# Patient Record
Sex: FEMALE | Race: WHITE | NOT HISPANIC OR LATINO | URBAN - METROPOLITAN AREA
[De-identification: names, ages, dates, MRNs, and addresses within clinical notes are randomized per-mention and may not be internally consistent; named-entity substitution may affect disease eponyms.]

---

## 2018-04-09 ENCOUNTER — APPOINTMENT (RX ONLY)
Dept: URBAN - METROPOLITAN AREA CLINIC 23 | Facility: CLINIC | Age: 58
Setting detail: DERMATOLOGY
End: 2018-04-09

## 2018-04-09 DIAGNOSIS — Z41.9 ENCOUNTER FOR PROCEDURE FOR PURPOSES OTHER THAN REMEDYING HEALTH STATE, UNSPECIFIED: ICD-10-CM

## 2018-04-09 PROCEDURE — ? DYSPORT

## 2018-04-09 PROCEDURE — ? FILLERS

## 2018-04-09 NOTE — PROCEDURE: FILLERS
Consent: Written consent obtained. Risks include but not limited to bruising, beading, irregular texture, ulceration, infection, allergic reaction, scar formation, incomplete augmentation, temporary nature, procedural pain.
Tear Troughs Filler  Volume In Cc: 0
Filler: Voluma
Include Cannula Information In Note?: No
Anesthesia Type: 1% lidocaine without epinephrine
Post-Care Instructions: Patient instructed to apply ice to reduce swelling.
Expiration Date (Month Year): 05/19
Lot #: [id number]
Additional Area 1 Location: lateral mouth, lateral cheeks, jawline, NLF, chin
Additional Anesthesia Volume In Cc: 6
Additional Area 1 Volume In Cc: 1
Map Statment: See [address] for Complete Details
Detail Level: Simple
Anesthesia Volume In Cc: 0.2

## 2018-04-23 ENCOUNTER — APPOINTMENT (RX ONLY)
Dept: URBAN - METROPOLITAN AREA CLINIC 23 | Facility: CLINIC | Age: 58
Setting detail: DERMATOLOGY
End: 2018-04-23

## 2018-04-23 DIAGNOSIS — Z41.9 ENCOUNTER FOR PROCEDURE FOR PURPOSES OTHER THAN REMEDYING HEALTH STATE, UNSPECIFIED: ICD-10-CM

## 2018-04-23 PROCEDURE — ? DYSPORT

## 2018-04-23 ASSESSMENT — LOCATION ZONE DERM: LOCATION ZONE: FACE

## 2018-04-23 ASSESSMENT — LOCATION SIMPLE DESCRIPTION DERM: LOCATION SIMPLE: RIGHT FOREHEAD

## 2018-04-23 ASSESSMENT — LOCATION DETAILED DESCRIPTION DERM: LOCATION DETAILED: RIGHT FOREHEAD

## 2018-04-23 NOTE — PROCEDURE: DYSPORT
Additional Area 4 Location: neck bands
Additional Area 4 Units: 0
Additional Area 6 Location: neck
Consent: Written consent obtained. Risks include but not limited to lid/brow ptosis, bruising, swelling, diplopia, temporary effect, incomplete chemical denervation.
Additional Area 1 Location: right forehead high
Expiration Date (Month Year): 09/30/2018
Lot #: [id number]
Detail Level: Zone
Additional Area 1 Units: 6
Dilution (U/ 0.1cc): 1.5
Price (Use Numbers Only, No Special Characters Or $): 0.00
Post-Care Instructions: Patient instructed to not lie down for 4 hours and limit physical activity for 24 hours. Patient instructed not to travel by airplane for 48 hours.
Additional Area 3 Location: above left lateral brow

## 2018-10-09 ENCOUNTER — APPOINTMENT (RX ONLY)
Dept: URBAN - METROPOLITAN AREA CLINIC 23 | Facility: CLINIC | Age: 58
Setting detail: DERMATOLOGY
End: 2018-10-09

## 2018-10-09 DIAGNOSIS — L82.0 INFLAMED SEBORRHEIC KERATOSIS: ICD-10-CM

## 2018-10-09 DIAGNOSIS — Z85.828 PERSONAL HISTORY OF OTHER MALIGNANT NEOPLASM OF SKIN: ICD-10-CM

## 2018-10-09 DIAGNOSIS — Z41.9 ENCOUNTER FOR PROCEDURE FOR PURPOSES OTHER THAN REMEDYING HEALTH STATE, UNSPECIFIED: ICD-10-CM

## 2018-10-09 PROCEDURE — ? COUNSELING

## 2018-10-09 PROCEDURE — ? BENIGN DESTRUCTION

## 2018-10-09 PROCEDURE — 17110 DESTRUCTION B9 LES UP TO 14: CPT

## 2018-10-09 PROCEDURE — 99213 OFFICE O/P EST LOW 20 MIN: CPT | Mod: 25

## 2018-10-09 PROCEDURE — ? BOTOX

## 2018-10-09 PROCEDURE — ? FILLERS

## 2018-10-09 PROCEDURE — ? SUNSCREEN RECOMMENDATIONS

## 2018-10-09 ASSESSMENT — LOCATION SIMPLE DESCRIPTION DERM: LOCATION SIMPLE: LEFT CHEEK

## 2018-10-09 ASSESSMENT — LOCATION ZONE DERM: LOCATION ZONE: FACE

## 2018-10-09 ASSESSMENT — LOCATION DETAILED DESCRIPTION DERM: LOCATION DETAILED: LEFT MEDIAL MALAR CHEEK

## 2018-10-09 NOTE — PROCEDURE: SUNSCREEN RECOMMENDATIONS

## 2018-10-09 NOTE — PROCEDURE: BOTOX
Masseter Units: 0
Lot #: [id number]
Additional Area 3 Location: lateral eyes
Additional Area 2 Units: [address]
Glabellar Complex Units: 16
Additional Area 3 Units: 8
Additional Area 2 Location: high forehead 2 cm above brows
Additional Area 4 Location: chin
Additional Area 4 Units: 4
Additional Area 1 Location: lateral brows
Expiration Date (Month Year): 12/2020
Price (Use Numbers Only, No Special Characters Or $): 0.00
Consent: Written consent obtained. Risks include but not limited to lid/brow ptosis, bruising, swelling, diplopia, temporary effect, incomplete chemical denervation.
Additional Area 6 Location: Forehead
Detail Level: Zone
Additional Area 5 Location: high forehead
Dilution (U/0.1 Cc): 1.2

## 2018-10-09 NOTE — HPI: SKIN LESION
What Type Of Note Output Would You Prefer (Optional)?: Bullet Format
How Severe Is Your Skin Lesion?: moderate
Has Your Skin Lesion Been Treated?: not been treated
Is This A New Presentation, Or A Follow-Up?: Growth

## 2018-10-09 NOTE — PROCEDURE: FILLERS
Jawline Filler Volume In Cc: 0
Additional Area 2 Volume In Cc: 1
Expiration Date (Month Year): 07/31/19
Anesthesia Volume In Cc: 0.2
Detail Level: Zone
Include Cannula Brand?: DermaSculpt
Include Cannula Information In Note?: No
Anesthesia Type: 1% lidocaine with epinephrine
Additional Area 1 Location: lateral mouth, fine lines perioral, marionette lines, vermillion lips
Additional Area 1 Location: lateral mouth, marionette lines, lateral cheeks, lower face fine lines
Additional Area 4 Location: lat brow using an acannula ,jawline and NLFs
Filler: Juvederm Ultra XC
Map Statment: See [address] for Complete Details
Additional Area 3 Location: lateral eyes, nasal root
Filler: Juvederm Voluma XC
Price (Use Numbers Only, No Special Characters Or $): 0.00
Post-Care Instructions: Patient instructed to apply ice to reduce swelling.
Additional Anesthesia Volume In Cc: 6
Expiration Date (Month Year): 08/19/2019
Additional Area 2 Location: diluted injected fine lines lat brows and fine lines cheeks
Additional Area 2 Location: lat jawline,Marionette and mouth area
Expiration Date (Month Year): 10/29/2019
Additional Area 5 Location: NLFs,tear trough  and lips
Consent: Written consent obtained. Risks include but not limited to bruising, beading, irregular texture, ulceration, infection, allergic reaction, scar formation, incomplete augmentation, temporary nature, procedural pain.
Lot #: [id number]
Lot #: [id number]
Lot #: [id number]
Additional Area 2 Location: Lateral cheeks, lateral jawline, marionette lines
Additional Area 1 Location: using the cannula to lateral cheeks.

## 2018-11-07 ENCOUNTER — APPOINTMENT (RX ONLY)
Dept: URBAN - METROPOLITAN AREA CLINIC 23 | Facility: CLINIC | Age: 58
Setting detail: DERMATOLOGY
End: 2018-11-07

## 2018-11-07 DIAGNOSIS — Z85.828 PERSONAL HISTORY OF OTHER MALIGNANT NEOPLASM OF SKIN: ICD-10-CM

## 2018-11-07 DIAGNOSIS — L82.0 INFLAMED SEBORRHEIC KERATOSIS: ICD-10-CM

## 2018-11-07 PROCEDURE — 17110 DESTRUCTION B9 LES UP TO 14: CPT

## 2018-11-07 PROCEDURE — ? LIQUID NITROGEN

## 2018-11-07 PROCEDURE — 99212 OFFICE O/P EST SF 10 MIN: CPT | Mod: 25

## 2018-11-07 PROCEDURE — ? SUNSCREEN RECOMMENDATIONS

## 2018-11-07 ASSESSMENT — LOCATION SIMPLE DESCRIPTION DERM: LOCATION SIMPLE: LEFT BREAST

## 2018-11-07 ASSESSMENT — LOCATION DETAILED DESCRIPTION DERM: LOCATION DETAILED: LEFT MEDIAL BREAST 10-11:00 REGION

## 2018-11-07 ASSESSMENT — LOCATION ZONE DERM: LOCATION ZONE: TRUNK

## 2018-11-07 NOTE — PROCEDURE: SUNSCREEN RECOMMENDATIONS

## 2018-11-07 NOTE — PROCEDURE: LIQUID NITROGEN
Number Of Freeze-Thaw Cycles: 2 freeze-thaw cycles
Render Post-Care Instructions In Note?: no
Medical Necessity Information: It is in your best interest to select a reason for this procedure from the list below. All of these items fulfill various CMS LCD requirements except the new and changing color options.
Medical Necessity Clause: This procedure was medically necessary because the lesions that were treated were:
Post-Care Instructions: I reviewed with the patient in detail post-care instructions. Patient is to wear sunprotection, and avoid picking at any of the treated lesions. Pt may apply Vaseline to crusted or scabbing areas.
Consent: The patient's consent was obtained including but not limited to risks of crusting, scabbing, blistering, scarring, darker or lighter pigmentary change, recurrence, incomplete removal and infection.
Detail Level: Simple
Duration Of Freeze Thaw-Cycle (Seconds): 5-10

## 2019-04-11 ENCOUNTER — APPOINTMENT (RX ONLY)
Dept: URBAN - METROPOLITAN AREA CLINIC 23 | Facility: CLINIC | Age: 59
Setting detail: DERMATOLOGY
End: 2019-04-11

## 2019-04-11 DIAGNOSIS — Z41.9 ENCOUNTER FOR PROCEDURE FOR PURPOSES OTHER THAN REMEDYING HEALTH STATE, UNSPECIFIED: ICD-10-CM

## 2019-04-11 PROCEDURE — ? FILLERS

## 2019-04-11 PROCEDURE — ? DYSPORT

## 2019-04-11 PROCEDURE — ? RECOMMENDATIONS

## 2019-04-11 NOTE — PROCEDURE: RECOMMENDATIONS
Detail Level: Detailed
Recommendation Preamble: The following recommendations were made during the visit:
Recommendations (Free Text): Profound for lower face tightening

## 2019-04-11 NOTE — PROCEDURE: DYSPORT
Forehead Units: [address] [address]
Additional Area 6 Units: 0
Additional Area 3 Location: Chin
Detail Level: Simple
Consent: Written consent obtained. Risks include but not limited to lid/brow ptosis, bruising, swelling, diplopia, temporary effect, incomplete chemical denervation.
Additional Area 4 Location: [facility]
Additional Area 1 Location: lateral brows
Nasal Root Units: 10
Dilution (U/ 0.1cc): 1.5
Periorbital Skin Units: 30
Lot #: [id number]
Price (Use Numbers Only, No Special Characters Or $): 0.00
Expiration Date (Month Year): 10/31/2019
Glabellar Complex Units: [address]

## 2019-04-11 NOTE — PROCEDURE: FILLERS
Lateral Face Filler  Volume In Cc: 0
Include Cannula Information In Note?: No
Include Cannula Length?: 1.5 inch
Additional Area 1 Location: perioral fine lines, lateral mouth, marionette lines
Additional Area 1 Location: lateral mouth, fine lines perioral, marionette lines, lateral cheeks, vermillion lips
Map Statment: See [address] for Complete Details
Additional Area 2 Location: Lips, oral commissure, glabellar fine fines
Additional Anesthesia Volume In Cc: 6
Additional Area 1 Location: lips ( physician sample)
Additional Area 2 Location: Nasalabial, Glabellar fine lines- Complimentary
Additional Area 3 Location: Glabbellar fine lines, Nasalabial folds, Marionette lines, vermillion lip
Additional Area 2 Location: line
Additional Area 4 Location: Lateral Cheeks
Additional Area 3 Location: glabella lines,upper lips fine lines,oral commesure and chin
Filler: Voluma
Additional Area 4 Location: lateral jawline, lateral cheeks, oral commissure, chin
Additional Area 4 Volume In Cc: 1
Lot #: [id number]
Detail Level: Zone
Additional Area 5 Location: medial  Cheeks using cannula
Lot #: [id number]
Expiration Date (Month Year): 07/25/2019
Lot #: [id number]
Price (Use Numbers Only, No Special Characters Or $): 0.00
Expiration Date (Month Year): 08/31/2019
Consent: Written consent obtained. Risks include but not limited to bruising, beading, irregular texture, ulceration, infection, allergic reaction, scar formation, incomplete augmentation, temporary nature, procedural pain.
Expiration Date (Month Year): 4/21/20
Post-Care Instructions: Patient instructed to apply ice to reduce swelling.
Anesthesia Type: 1% lidocaine without epinephrine
Include Cannula Brand?: DermaSculpt
Include Cannula Size?: 25G

## 2019-08-27 ENCOUNTER — APPOINTMENT (RX ONLY)
Dept: URBAN - METROPOLITAN AREA CLINIC 23 | Facility: CLINIC | Age: 59
Setting detail: DERMATOLOGY
End: 2019-08-27

## 2019-08-27 DIAGNOSIS — Z41.9 ENCOUNTER FOR PROCEDURE FOR PURPOSES OTHER THAN REMEDYING HEALTH STATE, UNSPECIFIED: ICD-10-CM

## 2019-08-27 PROCEDURE — ? FILLERS

## 2019-08-27 PROCEDURE — ? DYSPORT

## 2019-08-27 PROCEDURE — ? RECOMMENDATIONS

## 2019-08-27 NOTE — PROCEDURE: DYSPORT
Additional Area 3 Units: 48
Nasal Root Units: 0
Additional Area 1 Location: neck bands
Lot #: [id number]
Additional Area 4 Units: [address] [address]
Additional Area 5 Location: high forehead 2 cm above brows
Expiration Date (Month Year): 03/31/2020
Additional Area 2 Units: 10
Detail Level: Simple
Consent: Written consent obtained. Risks include but not limited to lid/brow ptosis, bruising, swelling, diplopia, temporary effect, incomplete chemical denervation.
Additional Area 4 Location: glabella
Dilution (U/ 0.1cc): 1.5
Additional Area 1 Units: [address] [facility]
Additional Area 2 Location: bunny lines
Additional Area 5 Units: 30
Price (Use Numbers Only, No Special Characters Or $): 0.00
Additional Area 3 Location: lateral eyes

## 2019-08-27 NOTE — PROCEDURE: FILLERS
Dorsal Hands Filler  Volume In Cc: 0
Include Cannula Size?: 25G
Additional Area 1 Location: Using the cannula to cheeks. Using a conventional needle to lateral jawline, lateral cheeks, marionette lines.
Additional Area 1 Location: lateral mouth, fine lines perioral, marionette lines, lateral cheeks, vermillion lips
Include Cannula Length?: 1.5 inch
Additional Area 1 Location: lateral mouth, perioral fine lines, vermillion lips, marionette lines
Additional Anesthesia Volume In Cc: 6
Additional Area 1 Volume In Cc: 1
Additional Area 2 Location: cheeks, jawline, oral commissure
Additional Area 2 Location: Nasalabial, Glabellar fine lines- Complimentary
Additional Area 2 Location: Lips, oral commissure, glabellar fine fines
Filler: Juvederm Voluma XC
Additional Area 3 Location: Glabbellar fine lines, Nasalabial folds, Marionette lines, vermillion lip
Detail Level: Zone
Include Cannula Information In Note?: No
Additional Area 4 Location: Perioral
Additional Area 4 Location: cheeks, lateral jawline, medial cheeks fine lines
Additional Area 5 Location: Cheeks, vermillion lips, marionette fine lines, glabellar fine lines, lateral mouth
Price (Use Numbers Only, No Special Characters Or $): 0.00
Lot #: [id number]
Map Statment: See [address] for Complete Details
Lot #: [id number]
Lot #: [id number]
Expiration Date (Month Year): 08/31/2019
Expiration Date (Month Year): 09/2021
Expiration Date (Month Year): 2020-11-04
Include Cannula Information In Note?: Yes
Anesthesia Type: 1% lidocaine without epinephrine
Include Cannula Brand?: DermaSculpt
Post-Care Instructions: Patient instructed to apply ice to reduce swelling.
Consent: Written consent obtained. Risks include but not limited to bruising, beading, irregular texture, ulceration, infection, allergic reaction, scar formation, incomplete augmentation, temporary nature, procedural pain.

## 2019-09-16 ENCOUNTER — APPOINTMENT (RX ONLY)
Dept: URBAN - METROPOLITAN AREA CLINIC 23 | Facility: CLINIC | Age: 59
Setting detail: DERMATOLOGY
End: 2019-09-16

## 2019-09-16 DIAGNOSIS — Z41.9 ENCOUNTER FOR PROCEDURE FOR PURPOSES OTHER THAN REMEDYING HEALTH STATE, UNSPECIFIED: ICD-10-CM

## 2019-09-16 PROCEDURE — ? FILLERS

## 2019-09-16 PROCEDURE — ? PULSED-DYE LASER

## 2019-09-16 NOTE — PROCEDURE: FILLERS
Nasolabial Folds Filler Volume In Cc: 0
Filler Comments: Physician sample
Additional Area 3 Location: Glabbellar fine lines, Nasalabial folds, Marionette lines, vermillion lip
Consent: Written consent obtained. Risks include but not limited to bruising, beading, irregular texture, ulceration, infection, allergic reaction, scar formation, incomplete augmentation, temporary nature, procedural pain.
Post-Care Instructions: Patient instructed to apply ice to reduce swelling.
Filler: Juvederm Volbella XC
Detail Level: Zone
Lot #: [id number]
Additional Area 4 Location: Perioral
Additional Area 4 Location: cheeks, lateral jawline, medial cheeks fine lines
Include Cannula Information In Note?: No
Price (Use Numbers Only, No Special Characters Or $): 0.00
Expiration Date (Month Year): 08/31/2019
Additional Area 5 Location: Cheeks, vermillion lips, marionette fine lines, glabellar fine lines, lateral mouth
Lot #: [id number]
Lot #: [id number]
Map Statment: See [address] for Complete Details
Expiration Date (Month Year): 2020-02-26
Expiration Date (Month Year): 09/2021
Anesthesia Type: 1% lidocaine without epinephrine
Include Cannula Brand?: DermaSculpt
Additional Area 2 Location: Nasalabial, Glabellar fine lines- Complimentary
Include Cannula Size?: 25G
Additional Anesthesia Volume In Cc: 6
Additional Area 1 Location: lateral mouth, perioral fine lines, vermillion lips, marionette lines
Additional Area 1 Location: left cheek ( physician sample)
Include Cannula Length?: 1.5 inch
Additional Area 1 Volume In Cc: 0.3
Additional Area 1 Location: lateral mouth, fine lines perioral, marionette lines, lateral cheeks, vermillion lips
Additional Area 2 Location: Lips, oral commissure, glabellar fine fines
Additional Area 2 Location: cheeks, jawline, oral commissure

## 2019-09-16 NOTE — PROCEDURE: PULSED-DYE LASER
Price (Use Numbers Only, No Special Characters Or $): 0.00
Fluence In J/Cm2 (Optional): 10.0
Cryogen Time (Ms): 10
Delay Time (Ms): [facility]
Location Override: post cosmetic ( left cheek) and spots nose
Spot Size: 10 mm
Cryogen Time (Ms): [address] [address]
Detail Level: Zone
Delay Time (Ms): 20
Laser Type: Vbeam 595nm
Pulse Duration: 6 ms
Consent: Written consent obtained, risks reviewed including but not limited to crusting, scabbing, blistering, scarring, darker or lighter pigmentary change, incidental hair removal, bruising, and/or incomplete removal.
Pulse Duration: 20 ms
Post-Care Instructions: I reviewed with the patient in detail post-care instructions. Patient should stay away from the sun and wear sun protection until treated areas are fully healed.
Cryogen Time (Ms): 30
Treated Area: medium area
Pulse Duration: 10 ms
Spot Size: 10x3 mm
Treated Area: small area
Fluence In J/Cm2 (Optional): 6.25
Fluence In J/Cm2 (Optional): 7.00
Post-Procedure Care: Sunscreen applied. Post care reviewed with patient.
Spot Size: 7 mm

## 2020-02-17 ENCOUNTER — APPOINTMENT (RX ONLY)
Dept: URBAN - METROPOLITAN AREA CLINIC 23 | Facility: CLINIC | Age: 60
Setting detail: DERMATOLOGY
End: 2020-02-17

## 2020-02-17 DIAGNOSIS — Z85.828 PERSONAL HISTORY OF OTHER MALIGNANT NEOPLASM OF SKIN: ICD-10-CM

## 2020-02-17 DIAGNOSIS — L57.0 ACTINIC KERATOSIS: ICD-10-CM

## 2020-02-17 DIAGNOSIS — D485 NEOPLASM OF UNCERTAIN BEHAVIOR OF SKIN: ICD-10-CM

## 2020-02-17 DIAGNOSIS — Z41.9 ENCOUNTER FOR PROCEDURE FOR PURPOSES OTHER THAN REMEDYING HEALTH STATE, UNSPECIFIED: ICD-10-CM

## 2020-02-17 PROBLEM — D48.5 NEOPLASM OF UNCERTAIN BEHAVIOR OF SKIN: Status: ACTIVE | Noted: 2020-02-17

## 2020-02-17 PROCEDURE — 99213 OFFICE O/P EST LOW 20 MIN: CPT | Mod: 25

## 2020-02-17 PROCEDURE — ? DYSPORT

## 2020-02-17 PROCEDURE — 17000 DESTRUCT PREMALG LESION: CPT | Mod: 59

## 2020-02-17 PROCEDURE — 11102 TANGNTL BX SKIN SINGLE LES: CPT

## 2020-02-17 PROCEDURE — ? LIQUID NITROGEN

## 2020-02-17 PROCEDURE — ? COUNSELING

## 2020-02-17 PROCEDURE — ? SUNSCREEN RECOMMENDATIONS

## 2020-02-17 PROCEDURE — ? BIOPSY BY SHAVE METHOD

## 2020-02-17 PROCEDURE — ? FILLERS

## 2020-02-17 ASSESSMENT — LOCATION ZONE DERM
LOCATION ZONE: TRUNK
LOCATION ZONE: NOSE

## 2020-02-17 ASSESSMENT — LOCATION SIMPLE DESCRIPTION DERM
LOCATION SIMPLE: CHEST
LOCATION SIMPLE: NOSE

## 2020-02-17 ASSESSMENT — LOCATION DETAILED DESCRIPTION DERM
LOCATION DETAILED: MIDDLE STERNUM
LOCATION DETAILED: NASAL SUPRATIP

## 2020-02-17 NOTE — PROCEDURE: DYSPORT
Additional Area 3 Location: lateral eyes
Additional Area 6 Units: 0
Dilution (U/ 0.1cc): 1.5
Expiration Date (Month Year): 09/30/2020
Additional Area 5 Location: glabella
Additional Area 4 Location: bunny lines
Additional Area 1 Units: [address] [address]
Lot #: [id number]
Additional Area 4 Units: 10
Additional Area 3 Units: [address]
Additional Area 2 Units: 30
Additional Area 1 Location: high forehead 2 cm above brows
Price (Use Numbers Only, No Special Characters Or $): 0.00
Detail Level: Simple
Consent: Written consent obtained. Risks include but not limited to lid/brow ptosis, bruising, swelling, diplopia, temporary effect, incomplete chemical denervation.

## 2020-02-17 NOTE — PROCEDURE: BIOPSY BY SHAVE METHOD
Detail Level: Detailed
Depth Of Biopsy: dermis
Was A Bandage Applied: Yes
Size Of Lesion In Cm: 0
Biopsy Type: H and E
Biopsy Method: 15 blade
Anesthesia Type: 1% lidocaine without epinephrine
Anesthesia Volume In Cc (Will Not Render If 0): 0.3
Hemostasis: Electrocautery
Wound Care: Vaseline
Dressing: bandage
Destruction After The Procedure: No
Type Of Destruction Used: Curettage
Curettage Text: The wound bed was treated with curettage after the biopsy was performed.
Cryotherapy Text: The wound bed was treated with cryotherapy after the biopsy was performed.
Electrodesiccation Text: The wound bed was treated with electrodesiccation after the biopsy was performed.
Electrodesiccation And Curettage Text: The wound bed was treated with electrodesiccation and curettage after the biopsy was performed.
Silver Nitrate Text: The wound bed was treated with silver nitrate after the biopsy was performed.
Lab: [id number] [facility] [address]
Consent: The provider's intent is to obtain a tissue sample solely for diagnostic purposes. Written consent to obtain tissue sample was obtained and risks were reviewed including but not limited to scarring, infection, bleeding, scabbing, incomplete removal, nerve damage and allergy to anesthesia.
Post-Care Instructions: I reviewed with the patient in detail post-care instructions. Patient is to keep the biopsy site dry overnight, and then apply bacitracin twice daily until healed. Patient may apply hydrogen peroxide soaks to remove any crusting.
Notification Instructions: Patient will be notified of biopsy results. However, patient instructed to call the office if not contacted within 2 weeks.
Billing Type: [employer]

## 2020-02-17 NOTE — PROCEDURE: FILLERS
Additional Area 2 Location: Nasalabial, Glabellar fine lines- Complimentary
Decollete Filler  Volume In Cc: 0
Include Cannula Size?: 25G
Additional Area 2 Location: Lips, oral commissure, glabellar fine fines
Additional Anesthesia Volume In Cc: 6
Additional Area 3 Location: Glabbellar fine lines, Nasalabial folds, Marionette lines, vermillion lip
Additional Area 1 Volume In Cc: 1
Include Cannula Length?: 1.5 inch
Additional Area 1 Location: lateral jawline, lateral cheeks, lateral mouth, NLFâs
Additional Area 2 Location: cheeks, jawline, oral commissure
Additional Area 4 Location: Perioral
Additional Area 4 Location: lateral jawline, lateral mouth, glabella lines,
Lot #: [id number]
Filler: Restylane-L
Detail Level: Zone
Consent: Written consent obtained. Risks include but not limited to bruising, beading, irregular texture, ulceration, infection, allergic reaction, scar formation, incomplete augmentation, temporary nature, procedural pain.
Lot #: [id number]
Post-Care Instructions: Patient instructed to apply ice to reduce swelling.
Additional Area 5 Location: Cheeks, vermillion lips, marionette fine lines, glabellar fine lines, lateral mouth
Expiration Date (Month Year): 09/2021
Price (Use Numbers Only, No Special Characters Or $): 0.00
Use Map Statement For Sites (Optional): No
Expiration Date (Month Year): 08/31/2019
Lot #: [id number]
Map Statment: See [address] for Complete Details
Include Cannula Brand?: DermaSculpt
Expiration Date (Month Year): 2022-06-30
Anesthesia Type: 1% lidocaine without epinephrine
Additional Area 1 Location: lateral mouth, perioral fine lines, vermillion lips, marionette lines
Additional Area 1 Location: lateral mouth, fine lines perioral, marionette lines, lateral cheeks, vermillion lips

## 2020-04-23 ENCOUNTER — APPOINTMENT (RX ONLY)
Dept: URBAN - METROPOLITAN AREA CLINIC 23 | Facility: CLINIC | Age: 60
Setting detail: DERMATOLOGY
End: 2020-04-23

## 2020-04-23 DIAGNOSIS — L24 IRRITANT CONTACT DERMATITIS: ICD-10-CM

## 2020-04-23 PROBLEM — Z92.89 PERSONAL HISTORY OF OTHER MEDICAL TREATMENT: Status: ACTIVE | Noted: 2020-04-23

## 2020-04-23 PROBLEM — D04.5 CARCINOMA IN SITU OF SKIN OF TRUNK: Status: ACTIVE | Noted: 2020-04-23

## 2020-04-23 PROBLEM — L24.9 IRRITANT CONTACT DERMATITIS, UNSPECIFIED CAUSE: Status: ACTIVE | Noted: 2020-04-23

## 2020-04-23 PROCEDURE — ? PRESCRIPTION

## 2020-04-23 PROCEDURE — ? REASON FOR TELEMEDICINE VISIT

## 2020-04-23 PROCEDURE — ? DIAGNOSIS COMMENT

## 2020-04-23 PROCEDURE — ? CONSENT FOR TELEMEDICINE VISIT OBTAINED

## 2020-04-23 PROCEDURE — ? TELEHEALTH ASSESSMENT

## 2020-04-23 PROCEDURE — ? DEFER

## 2020-04-23 PROCEDURE — 99213 OFFICE O/P EST LOW 20 MIN: CPT | Mod: GT

## 2020-04-23 PROCEDURE — ? COUNSELING

## 2020-04-23 PROCEDURE — ? PATHOLOGY DISCUSSION

## 2020-04-23 RX ORDER — TRIAMCINOLONE ACETONIDE 1 MG/G
1 OINTMENT TOPICAL BID
Qty: 1 | Refills: 1 | Status: ERX | COMMUNITY
Start: 2020-04-23

## 2020-04-23 RX ADMIN — TRIAMCINOLONE ACETONIDE 1: 1 OINTMENT TOPICAL at 00:00

## 2020-04-23 ASSESSMENT — LOCATION DETAILED DESCRIPTION DERM: LOCATION DETAILED: RIGHT RADIAL DORSAL HAND

## 2020-04-23 ASSESSMENT — LOCATION ZONE DERM: LOCATION ZONE: HAND

## 2020-04-23 ASSESSMENT — LOCATION SIMPLE DESCRIPTION DERM: LOCATION SIMPLE: RIGHT HAND

## 2020-04-23 NOTE — PROCEDURE: DEFER
Detail Level: Detailed
Body Location Override (Optional - Billing Will Still Be Based On Selected Body Map Location If Applicable): middle sternum
Introduction Text (Please End With A Colon): The following procedure was deferred:

## 2020-04-23 NOTE — PROCEDURE: MIPS QUALITY
Quality 130: Documentation Of Current Medications In The Medical Record: Current Medications Documented
Additional Notes: patient is not in the age range for a pneomonia vaccine & tobacco use 4/2020
Detail Level: Detailed

## 2020-04-23 NOTE — PROCEDURE: TELEHEALTH ASSESSMENT
Detail Level: Simple
Assessment (Free Text): The patient verified their identity with their photo ID and consented to evaluation and management of their medical condition through telehealth. This visit was conducted in real-time with an audio and video platform, [email or web address] me during the [address] during a state of national emergency. This telehealth visit was medically necessary to prevent the community spread of COVID-19. \\n\\nThe patient is aware that we will bill their insurance for this telehealth visit following insurance guidelines. \\n\\nFace to face time with the patient was 15 minutes. \\n\\nConsent:\\n\\nPatient consented to the following prior to the visit: \"I consent and understand that I am initiating a synchronous video health session with my healthcare provider and will be asked to confirm my identity with photo identification. I understand that there are potential risks to this technology, including interruptions, potential data breaches, and technical difficulties. Poor video quality may interfere with my healthcare providerâs ability to accurately diagnose my condition. I understand that my health care provider or I can discontinue the telemedicine consult/visit if it is felt that the videoconferencing connections are not adequate for the situation. I also understand that my insurance carrier will be billed for healthcare services rendered. \" TYPED YES TO CONSENT
Recommendation Preamble: Assessment:

## 2020-06-08 ENCOUNTER — APPOINTMENT (RX ONLY)
Dept: URBAN - METROPOLITAN AREA CLINIC 23 | Facility: CLINIC | Age: 60
Setting detail: DERMATOLOGY
End: 2020-06-08

## 2020-06-08 VITALS — TEMPERATURE: 97.3 F

## 2020-06-08 DIAGNOSIS — Z85.828 PERSONAL HISTORY OF OTHER MALIGNANT NEOPLASM OF SKIN: ICD-10-CM

## 2020-06-08 DIAGNOSIS — Z41.9 ENCOUNTER FOR PROCEDURE FOR PURPOSES OTHER THAN REMEDYING HEALTH STATE, UNSPECIFIED: ICD-10-CM

## 2020-06-08 PROBLEM — D04.5 CARCINOMA IN SITU OF SKIN OF TRUNK: Status: ACTIVE | Noted: 2020-06-08

## 2020-06-08 PROCEDURE — 17260 DSTRJ MAL LES T/A/L 0.5 CM/<: CPT

## 2020-06-08 PROCEDURE — 99213 OFFICE O/P EST LOW 20 MIN: CPT | Mod: 25

## 2020-06-08 PROCEDURE — ? COUNSELING

## 2020-06-08 PROCEDURE — ? SUNSCREEN RECOMMENDATIONS

## 2020-06-08 PROCEDURE — ? CRYOSURGICAL DESTRUCTION

## 2020-06-08 PROCEDURE — ? DYSPORT

## 2020-06-08 NOTE — PROCEDURE: CRYOSURGICAL DESTRUCTION
Detail Level: Detailed
Add Intralesional Injection: No
Medication Injected: 5-Fluorouracil
Total Volume (Ccs): 1
Anesthesia Type: 1% lidocaine with epinephrine
Anesthesia Volume In Cc: 0.3
Number Of Freeze-Thaw Cycles: 3 freeze-thaw cycles
Total Time In Minutes: 3 minutes
Additional Information: (Optional): The wound was cleaned, and a dressing was applied. The patient received detailed post-op instructions.
Pre-Procedure: The surgical site was antiseptically prepared.
Consent was obtained from the patient. The risks and benefits to therapy were discussed in detail. Specifically, the risks of infection, scarring, bleeding, prolonged wound healing, incomplete removal, allergy to anesthesia, nerve injury and recurrence were addressed. Alternatives to liquid nitrogen, such as ED&C, surgical removal, XRT were also discussed. Prior to the procedure, the treatment site was clearly identified and confirmed by the patient. All components of Universal Protocol/PAUSE Rule completed.
Render Post-Care In The Note: Yes
Post-Care Instructions: I reviewed with the patient in detail post-care instructions. Patient is to keep the area dry for 48 hours, and not to engage in any heavy lifting, exercise, or swimming for the next 14 days. Should the patient develop any fevers, chills, bleeding, severe pain patient will contact the office immediately.
Bill As A Line Item Or As Units: Line Item

## 2020-06-08 NOTE — PROCEDURE: DYSPORT
Additional Area 5 Location: glabella
Levator Labii Superioris Units: 0
Show Levator Superior Units: Yes
Dilution (U/ 0.1cc): 1.5
Additional Area 3 Location: high forehead 3 cm above brows
Show Mentalis Units: No
Detail Level: Simple
Consent: Written consent obtained. Risks include but not limited to lid/brow ptosis, bruising, swelling, diplopia, temporary effect, incomplete chemical denervation.
Additional Area 1 Location: glabella ( touch-up)
Additional Area 4 Location: 2cm high forehead
Expiration Date (Month Year): 11/30/2020
Additional Area 1 Units: [facility]
Price (Use Numbers Only, No Special Characters Or $): 0.00
Lot #: [id number]
Additional Area 2 Location: crows feet

## 2020-07-22 ENCOUNTER — APPOINTMENT (RX ONLY)
Dept: URBAN - METROPOLITAN AREA CLINIC 23 | Facility: CLINIC | Age: 60
Setting detail: DERMATOLOGY
End: 2020-07-22

## 2020-07-22 VITALS — TEMPERATURE: 97.3 F

## 2020-07-22 DIAGNOSIS — Z41.9 ENCOUNTER FOR PROCEDURE FOR PURPOSES OTHER THAN REMEDYING HEALTH STATE, UNSPECIFIED: ICD-10-CM

## 2020-07-22 PROCEDURE — ? FILLERS

## 2020-07-22 PROCEDURE — ? ADDITIONAL NOTES

## 2020-07-22 PROCEDURE — ? DYSPORT

## 2020-07-22 ASSESSMENT — LOCATION SIMPLE DESCRIPTION DERM: LOCATION SIMPLE: SCALP

## 2020-07-22 ASSESSMENT — LOCATION DETAILED DESCRIPTION DERM: LOCATION DETAILED: LEFT CENTRAL OCCIPITAL SCALP

## 2020-07-22 ASSESSMENT — LOCATION ZONE DERM: LOCATION ZONE: SCALP

## 2020-07-22 NOTE — PROCEDURE: DYSPORT
Additional Area 4 Location: 2cm high forehead
Detail Level: Simple
Periorbital Skin Units: [address]
Mentalis Units: 0
Expiration Date (Month Year): 09/30/20
Show Masseter Units: Yes
Additional Area 2 Location: lat brows
Show Right And Left Brow Units: No
Additional Area 1 Units: 10
Price (Use Numbers Only, No Special Characters Or $): 0.00
Glabellar Complex Units: [address]
Additional Area 1 Location: bunny lines
Lot #: [id number]
Additional Area 5 Location: glabella
Lateral Platysmal Bands Units: [address] [facility]
Dilution (U/ 0.1cc): 1.5
Additional Area 3 Location: high forehead 3 cm above brows
Consent: Written consent obtained. Risks include but not limited to lid/brow ptosis, bruising, swelling, diplopia, temporary effect, incomplete chemical denervation.

## 2020-07-22 NOTE — PROCEDURE: FILLERS
Map Statment: See [address] for Complete Details
Dorsal Hands Filler  Volume In Cc: 0
Additional Area 2 Location: Nasalabial, Glabellar fine lines- Complimentary
Additional Area 4 Location: lateral jawline, lateral mouth, glabella lines,
Additional Area 3 Location: Glabbellar fine lines, Nasalabial folds, Marionette lines, vermillion lip
Anesthesia Type: 1% lidocaine without epinephrine
Additional Area 5 Location: Cheeks, vermillion lips, marionette fine lines, glabellar fine lines, lateral mouth
Additional Area 4 Location: Perioral
Consent: Written consent obtained. Risks include but not limited to bruising, beading, irregular texture, ulceration, infection, allergic reaction, scar formation, incomplete augmentation, temporary nature, procedural pain.
Lot #: [id number]
Post-Care Instructions: Patient instructed to apply ice to reduce swelling.
Include Cannula Information In Note?: No
Expiration Date (Month Year): 2022-09
Lot #: [id number]
Additional Anesthesia Volume In Cc: 6
Lot #: [id number]
Expiration Date (Month Year): 09/2021
Expiration Date (Month Year): 08/31/2019
Include Cannula Brand?: DermaSculpt
Include Cannula Size?: 25G
Detail Level: Zone
Filler: Restylane-L
Additional Area 1 Location: cheeks, lateral mouth, nlfs , glabella
Include Cannula Length?: 1.5 inch
Price (Use Numbers Only, No Special Characters Or $): 0.00
Additional Area 2 Location: jawline, oral commissure and upper lip
Additional Area 1 Location: lateral mouth, perioral fine lines, vermillion lips, marionette lines
Additional Area 2 Volume In Cc: 1
Additional Area 1 Location: lateral mouth, fine lines perioral, marionette lines, lateral cheeks, vermillion lips
Additional Area 2 Location: Lips, oral commissure, glabellar fine fines

## 2020-08-19 NOTE — PROCEDURE: DYSPORT
Additional Area 1 Units: [address]
Additional Area 2 Units: 10
Additional Area 3 Location: nose
Additional Area 2 Location: chin
Periorbital Skin Units: 0
Playful
Glabellar Complex Units: 27
Detail Level: Zone
Additional Area 1 Location: Lateral eyes
Additional Area 4 Location: neck
Additional Area 4 Units: [address]
Additional Area 5 Location: high forehead
Additional Area 5 Units: 30
Consent: Written consent obtained. Risks include but not limited to lid/brow ptosis, bruising, swelling, diplopia, temporary effect, incomplete chemical denervation.
Dilution (U/ 0.1cc): 1.5
Expiration Date (Month Year): 09/18
Post-Care Instructions: Patient instructed to not lie down for 4 hours and limit physical activity for 24 hours. Patient instructed not to travel by airplane for 48 hours.
Lot #: [id number]

## 2020-08-24 ENCOUNTER — APPOINTMENT (RX ONLY)
Dept: URBAN - METROPOLITAN AREA CLINIC 23 | Facility: CLINIC | Age: 60
Setting detail: DERMATOLOGY
End: 2020-08-24

## 2020-08-24 VITALS — TEMPERATURE: 97.7 F

## 2020-08-24 DIAGNOSIS — Z85.828 PERSONAL HISTORY OF OTHER MALIGNANT NEOPLASM OF SKIN: ICD-10-CM

## 2020-08-24 PROCEDURE — ? COUNSELING

## 2020-08-24 PROCEDURE — ? SUNSCREEN RECOMMENDATIONS

## 2020-08-24 NOTE — PROCEDURE: MIPS QUALITY
Quality 226: Preventive Care And Screening: Tobacco Use: Screening And Cessation Intervention: Patient screened for tobacco use, is a smoker AND did not received Cessation Counseling for Unknown Reasons
Quality 111:Pneumonia Vaccination Status For Older Adults: Pneumococcal Vaccination not Administered or Previously Received, Reason not Otherwise Specified
Detail Level: Detailed
Quality 130: Documentation Of Current Medications In The Medical Record: Current Medications Documented

## 2020-12-29 ENCOUNTER — RX ONLY (OUTPATIENT)
Age: 60
Setting detail: RX ONLY
End: 2020-12-29

## 2020-12-29 ENCOUNTER — APPOINTMENT (RX ONLY)
Dept: URBAN - METROPOLITAN AREA CLINIC 23 | Facility: CLINIC | Age: 60
Setting detail: DERMATOLOGY
End: 2020-12-29

## 2020-12-29 VITALS — TEMPERATURE: 97.2 F

## 2020-12-29 DIAGNOSIS — Z41.9 ENCOUNTER FOR PROCEDURE FOR PURPOSES OTHER THAN REMEDYING HEALTH STATE, UNSPECIFIED: ICD-10-CM

## 2020-12-29 PROCEDURE — ? DYSPORT

## 2020-12-29 PROCEDURE — ? FILLERS

## 2020-12-29 RX ORDER — BETAMETHASONE DIPROPIONATE 0.5 MG/G
OINTMENT TOPICAL
Qty: 1 | Refills: 2 | Status: ERX | COMMUNITY
Start: 2020-12-29

## 2020-12-29 NOTE — PROCEDURE: DYSPORT
Show Ucl Units: No
Forehead Units: 0
Show Forehead Units: Yes
Additional Area 3 Location: neck bands
Additional Area 5 Units: 10
Lot #: [id number]
Additional Area 1 Location: high forehead
Additional Area 3 Units: [address]
Additional Area 6 Location: lateral brows
Glabellar Complex Units: 48
Price (Use Numbers Only, No Special Characters Or $): 0.00
Additional Area 4 Location: 2cm high forehead
Expiration Date (Month Year): 08/31/21
Additional Area 2 Location: crows feet
Periorbital Skin Units: [address]
Detail Level: Simple
Additional Area 5 Location: bunnies lines
Consent: Written consent obtained. Risks include but not limited to lid/brow ptosis, bruising, swelling, diplopia, temporary effect, incomplete chemical denervation.
Dilution (U/ 0.1cc): 1.5

## 2020-12-29 NOTE — PROCEDURE: FILLERS
[place] Filler  Volume In Cc: 0
Detail Level: Zone
Filler: Mohan Amaya
Include Cannula Information In Note?: No
Additional Area 2 Volume In Cc: 1
Include Cannula Brand?: DermaSculpt
Additional Area 2 Location: lateral cheeks, jawline, oral commissure
Lot #: [id number]
Additional Area 1 Location: lateral mouth, perioral fine lines, vermillion lips, marionette lines
Include Cannula Length?: 1.5 inch
Additional Area 1 Location: cheeks, lateral mouth, nlfs , glabella
Expiration Date (Month Year): 09/2021
Additional Anesthesia Volume In Cc: 6
Include Cannula Size?: 25G
Lot #: [id number]
Post-Care Instructions: Patient instructed to apply ice to reduce swelling.
Consent: Written consent obtained. Risks include but not limited to bruising, beading, irregular texture, ulceration, infection, allergic reaction, scar formation, incomplete augmentation, temporary nature, procedural pain.
Anesthesia Type: 1% lidocaine without epinephrine
Additional Area 4 Location: Perioral
Expiration Date (Month Year): 2023-07-31
Lot #: [id number]
Additional Area 3 Location: Glabbellar fine lines, Nasalabial folds, Marionette lines, vermillion lip
Additional Area 2 Location: Nasalabial, Glabellar fine lines- Complimentary
Map Statment: See [address] for Complete Details
Additional Area 2 Location: Lips, oral commissure, glabellar fine fines
Additional Area 5 Location: Cheeks, vermillion lips, marionette fine lines, glabellar fine lines, lateral mouth
Additional Area 1 Location: lateral mouth, fine lines perioral, marionette lines, lateral cheeks, vermillion lips
Expiration Date (Month Year): 08/31/2019
Price (Use Numbers Only, No Special Characters Or $): 0.00
Additional Area 4 Location: lateral jawline, lateral mouth, glabella lines,

## 2021-01-19 ENCOUNTER — APPOINTMENT (RX ONLY)
Dept: URBAN - METROPOLITAN AREA CLINIC 23 | Facility: CLINIC | Age: 61
Setting detail: DERMATOLOGY
End: 2021-01-19

## 2021-01-19 VITALS — TEMPERATURE: 98.1 F

## 2021-01-19 DIAGNOSIS — Z41.9 ENCOUNTER FOR PROCEDURE FOR PURPOSES OTHER THAN REMEDYING HEALTH STATE, UNSPECIFIED: ICD-10-CM

## 2021-01-19 PROCEDURE — ? DYSPORT

## 2021-01-19 NOTE — PROCEDURE: DYSPORT
Inferior Lateral Orbicularis Oculi Units: 0
Show Nasal Units: Yes
Additional Area 4 Location: 2cm high forehead
Detail Level: Simple
Expiration Date (Month Year): 2021/05
Show Mentalis Units: No
Periorbital Skin Units: 10
Additional Area 1 Location: high forehead
Forehead Units: 5
Additional Area 3 Location: high forehead 3 cm above brows
Consent: Written consent obtained. Risks include but not limited to lid/brow ptosis, bruising, swelling, diplopia, temporary effect, incomplete chemical denervation.
Dilution (U/ 0.1cc): 1.5
Additional Area 5 Location: glabella
Lot #: [id number]
Price (Use Numbers Only, No Special Characters Or $): 0.00
Additional Area 2 Location: bunny line

## 2021-02-18 ENCOUNTER — APPOINTMENT (RX ONLY)
Dept: URBAN - METROPOLITAN AREA CLINIC 23 | Facility: CLINIC | Age: 61
Setting detail: DERMATOLOGY
End: 2021-02-18

## 2021-02-18 VITALS — TEMPERATURE: 97.3 F

## 2021-02-18 DIAGNOSIS — L40.8 OTHER PSORIASIS: ICD-10-CM

## 2021-02-18 DIAGNOSIS — L21.8 OTHER SEBORRHEIC DERMATITIS: ICD-10-CM

## 2021-02-18 PROCEDURE — ? INTRALESIONAL KENALOG

## 2021-02-18 PROCEDURE — ? PRESCRIPTION

## 2021-02-18 PROCEDURE — 11900 INJECT SKIN LESIONS </W 7: CPT

## 2021-02-18 PROCEDURE — 99213 OFFICE O/P EST LOW 20 MIN: CPT | Mod: 25

## 2021-02-18 PROCEDURE — ? COUNSELING

## 2021-02-18 RX ORDER — FLUOCINOLONE ACETONIDE 0.1 MG/ML
SOLUTION TOPICAL
Qty: 1 | Refills: 0 | Status: CANCELLED
Stop reason: CLARIF

## 2021-02-18 ASSESSMENT — LOCATION DETAILED DESCRIPTION DERM
LOCATION DETAILED: LEFT SUPERIOR POSTERIOR NECK
LOCATION DETAILED: RIGHT CENTRAL FRONTAL SCALP
LOCATION DETAILED: RIGHT CENTRAL PARIETAL SCALP

## 2021-02-18 ASSESSMENT — LOCATION ZONE DERM
LOCATION ZONE: NECK
LOCATION ZONE: SCALP

## 2021-02-18 ASSESSMENT — LOCATION SIMPLE DESCRIPTION DERM
LOCATION SIMPLE: SCALP
LOCATION SIMPLE: POSTERIOR NECK
LOCATION SIMPLE: RIGHT SCALP

## 2021-02-18 NOTE — PROCEDURE: INTRALESIONAL KENALOG
Include Z78.9 (Other Specified Conditions Influencing Health Status) As An Associated Diagnosis?: No
Treatment Number (Optional): 1
Medical Necessity Clause: This procedure was medically necessary because the lesions that were treated were:
Total Volume Injected (Ccs- Only Use Numbers And Decimals): 0.2
Concentration Of Solution Injected (Mg/Ml): 4.0
Detail Level: Detailed
Consent: The risks of atrophy were reviewed with the patient.
Lot # (Optional): [id number]
Expiration Date (Optional): 03/22
Ndc# For Kenalog Only: [id number]
X Size Of Lesion In Cm (Optional): 0
Kenalog Preparation: Kenalog
Administered By (Optional): Dr Tc Morales

## 2021-02-19 ENCOUNTER — RX ONLY (OUTPATIENT)
Age: 61
Setting detail: RX ONLY
End: 2021-02-19

## 2021-02-19 RX ORDER — FLUOCINOLONE ACETONIDE 0.1 MG/ML
SOLUTION TOPICAL
Qty: 1 | Refills: 2 | Status: ERX | COMMUNITY
Start: 2021-02-19

## 2021-03-05 ENCOUNTER — RX ONLY (OUTPATIENT)
Age: 61
Setting detail: RX ONLY
End: 2021-03-05

## 2021-03-05 RX ORDER — BETAMETHASONE DIPROPIONATE 0.5 MG/G
1 OINTMENT TOPICAL QD
Qty: 1 | Refills: 2 | Status: ERX

## 2021-03-11 ENCOUNTER — APPOINTMENT (RX ONLY)
Dept: URBAN - METROPOLITAN AREA CLINIC 23 | Facility: CLINIC | Age: 61
Setting detail: DERMATOLOGY
End: 2021-03-11

## 2021-03-11 VITALS — TEMPERATURE: 97.7 F

## 2021-03-11 DIAGNOSIS — L21.8 OTHER SEBORRHEIC DERMATITIS: ICD-10-CM

## 2021-03-11 PROCEDURE — 99213 OFFICE O/P EST LOW 20 MIN: CPT

## 2021-03-11 PROCEDURE — ? PRESCRIPTION

## 2021-03-11 PROCEDURE — ? COUNSELING

## 2021-03-11 PROCEDURE — ? TREATMENT REGIMEN

## 2021-03-11 RX ORDER — CLOBETASOL PROPIONATE 0.5 MG/G
AEROSOL, FOAM TOPICAL
Qty: 1 | Refills: 3 | Status: ERX | COMMUNITY
Start: 2021-03-11

## 2021-03-11 RX ORDER — FLUCONAZOLE 150 MG/1
TABLET ORAL
Qty: 3 | Refills: 0 | Status: ERX | COMMUNITY
Start: 2021-03-11

## 2021-03-11 RX ADMIN — CLOBETASOL PROPIONATE 1: 0.5 AEROSOL, FOAM TOPICAL at 00:00

## 2021-03-11 RX ADMIN — FLUCONAZOLE: 150 TABLET ORAL at 00:00

## 2021-03-11 NOTE — PROCEDURE: TREATMENT REGIMEN
Discontinue Regimen: Synalar solution (pt states too greasy)
Initiate Treatment: clobetasol foam (selected as more tolerable vehicle)\\ndiflucan 150mg weekly x 3 weeks
Continue Regimen: Alternate T-gel with Head and Shoulders
Plan: Pt with classic areas of seborrheic dermatitis noted today: ears, eyebrows, posterior neck and scalp. Trial of oral diflucan to alleviate symptoms and improvement condition.
Detail Level: Detailed

## 2021-04-21 ENCOUNTER — APPOINTMENT (RX ONLY)
Dept: URBAN - METROPOLITAN AREA CLINIC 23 | Facility: CLINIC | Age: 61
Setting detail: DERMATOLOGY
End: 2021-04-21

## 2021-04-21 VITALS — TEMPERATURE: 97.3 F

## 2021-04-21 DIAGNOSIS — L21.8 OTHER SEBORRHEIC DERMATITIS: ICD-10-CM

## 2021-04-21 DIAGNOSIS — L24 IRRITANT CONTACT DERMATITIS: ICD-10-CM

## 2021-04-21 PROBLEM — L24.9 IRRITANT CONTACT DERMATITIS, UNSPECIFIED CAUSE: Status: ACTIVE | Noted: 2021-04-21

## 2021-04-21 PROCEDURE — 99213 OFFICE O/P EST LOW 20 MIN: CPT

## 2021-04-21 PROCEDURE — ? PRESCRIPTION

## 2021-04-21 PROCEDURE — ? COUNSELING

## 2021-04-21 PROCEDURE — ? RECOMMENDATIONS

## 2021-04-21 RX ORDER — FLUOCINOLONE ACETONIDE 0.11 MG/ML
1 OIL TOPICAL PRN
Qty: 1 | Refills: 3 | Status: ACTIVE

## 2021-04-21 RX ORDER — CLOBETASOL PROPIONATE 0.05 G/100ML
1 SHAMPOO TOPICAL PRN
Qty: 1 | Refills: 1 | Status: ERX | COMMUNITY
Start: 2021-04-21

## 2021-04-21 RX ORDER — BETAMETHASONE DIPROPIONATE 0.5 MG/G
1 OINTMENT TOPICAL QHS
Qty: 1 | Refills: 2 | Status: CANCELLED
Stop reason: CLARIF

## 2021-04-21 RX ADMIN — CLOBETASOL PROPIONATE 1: 0.05 SHAMPOO TOPICAL at 00:00

## 2021-04-21 ASSESSMENT — LOCATION ZONE DERM
LOCATION ZONE: SCALP
LOCATION ZONE: NECK

## 2021-04-21 ASSESSMENT — LOCATION SIMPLE DESCRIPTION DERM
LOCATION SIMPLE: POSTERIOR NECK
LOCATION SIMPLE: RIGHT SCALP

## 2021-04-21 ASSESSMENT — LOCATION DETAILED DESCRIPTION DERM
LOCATION DETAILED: RIGHT MEDIAL FRONTAL SCALP
LOCATION DETAILED: MID POSTERIOR NECK

## 2021-04-21 NOTE — PROCEDURE: RECOMMENDATIONS
Render Risk Assessment In Note?: no
Recommendation Preamble: The following recommendations were made during the visit:
Detail Level: Detailed
Recommendations (Free Text): Recommended scalpicin OTC

## 2021-04-22 ENCOUNTER — RX ONLY (OUTPATIENT)
Age: 61
Setting detail: RX ONLY
End: 2021-04-22

## 2021-04-22 RX ORDER — CLOBETASOL PROPIONATE 0.05 G/100ML
1 SHAMPOO TOPICAL QD
Qty: 1 | Refills: 0 | Status: ERX

## 2021-04-22 RX ORDER — FLUOCINOLONE ACETONIDE 0.11 MG/ML
1 OIL TOPICAL QOD
Qty: 1 | Refills: 2 | Status: ERX | COMMUNITY
Start: 2021-04-22

## 2021-09-24 ENCOUNTER — RX ONLY (OUTPATIENT)
Age: 61
Setting detail: RX ONLY
End: 2021-09-24

## 2021-09-24 RX ORDER — CLOBETASOL PROPIONATE 0.5 MG/G
PRN AEROSOL, FOAM TOPICAL
Qty: 50 | Refills: 2 | Status: ERX

## 2021-09-27 ENCOUNTER — APPOINTMENT (RX ONLY)
Dept: URBAN - METROPOLITAN AREA CLINIC 23 | Facility: CLINIC | Age: 61
Setting detail: DERMATOLOGY
End: 2021-09-27

## 2021-09-27 DIAGNOSIS — L82.0 INFLAMED SEBORRHEIC KERATOSIS: ICD-10-CM

## 2021-09-27 DIAGNOSIS — L30.9 DERMATITIS, UNSPECIFIED: ICD-10-CM

## 2021-09-27 PROCEDURE — 17110 DESTRUCTION B9 LES UP TO 14: CPT

## 2021-09-27 PROCEDURE — ? LIQUID NITROGEN

## 2021-09-27 PROCEDURE — 99212 OFFICE O/P EST SF 10 MIN: CPT | Mod: 25

## 2021-09-27 PROCEDURE — ? COUNSELING

## 2021-09-27 PROCEDURE — ? RECOMMENDATIONS

## 2021-09-27 ASSESSMENT — LOCATION SIMPLE DESCRIPTION DERM
LOCATION SIMPLE: LEFT UPPER BACK
LOCATION SIMPLE: PERIANAL SKIN

## 2021-09-27 ASSESSMENT — LOCATION DETAILED DESCRIPTION DERM
LOCATION DETAILED: PERIANAL SKIN
LOCATION DETAILED: LEFT MID-UPPER BACK

## 2021-09-27 ASSESSMENT — LOCATION ZONE DERM
LOCATION ZONE: ANUS
LOCATION ZONE: TRUNK

## 2021-09-27 NOTE — PROCEDURE: RECOMMENDATIONS
Recommendations (Free Text): Desitin for the buttocks
Detail Level: Detailed
Recommendation Preamble: The following recommendations were made at todayâs visit :
Render Risk Assessment In Note?: no

## 2021-10-13 NOTE — PROCEDURE: MIPS QUALITY
Quality 111:Pneumonia Vaccination Status For Older Adults: Pneumococcal Vaccination not Administered or Previously Received, Reason not Otherwise Specified
Detail Level: Detailed
Quality 130: Documentation Of Current Medications In The Medical Record: Current Medications Documented
Bilateral groin sites s/p ablation

## 2021-10-26 ENCOUNTER — APPOINTMENT (RX ONLY)
Dept: URBAN - METROPOLITAN AREA CLINIC 23 | Facility: CLINIC | Age: 61
Setting detail: DERMATOLOGY
End: 2021-10-26

## 2021-10-26 DIAGNOSIS — Z41.9 ENCOUNTER FOR PROCEDURE FOR PURPOSES OTHER THAN REMEDYING HEALTH STATE, UNSPECIFIED: ICD-10-CM

## 2021-10-26 PROCEDURE — ? DYSPORT

## 2021-10-26 PROCEDURE — ? FILLERS

## 2021-10-26 NOTE — PROCEDURE: FILLERS
Dorsal Hands Filler  Volume In Cc: 0
Additional Anesthesia Volume In Cc: 6
Include Cannula Size?: 25G
Expiration Date (Month Year): 08/31/2019
Additional Area 1 Location: cheeks, lateral mouth, nlfs
Lot #: [id number]
Include Cannula Information In Note?: No
Additional Area 1 Volume In Cc: 1
Expiration Date (Month Year): 09/2021
Include Cannula Length?: 1.5 inch
Additional Area 2 Location: cheeks, jawline, oral commissure
Detail Level: Zone
Filler: Mohan Amaya
Include Cannula Brand?: DermaSculpt
Additional Area 1 Location: lateral mouth, fine lines perioral, marionette lines, lateral cheeks, vermillion lips
Price (Use Numbers Only, No Special Characters Or $): 0.00
Additional Area 4 Location: lateral jawline, lateral mouth, glabella lines,
Consent: Written consent obtained. Risks include but not limited to bruising, beading, irregular texture, ulceration, infection, allergic reaction, scar formation, incomplete augmentation, temporary nature, procedural pain.
Additional Area 1 Location: lateral mouth, perioral fine lines, vermillion lips, marionette lines
Additional Area 2 Location: Nasalabial, Glabellar fine lines- Complimentary
Post-Care Instructions: Patient instructed to apply ice to reduce swelling.
Additional Area 5 Location: Cheeks, vermillion lips, marionette fine lines, glabellar fine lines, lateral mouth
Map Statment: See [address] for Complete Details
Additional Area 2 Location: Lips, oral commissure, glabellar fine fines
Lot #: [id number]
Additional Area 3 Location: Glabbellar fine lines, Nasalabial folds, Marionette lines, vermillion lip
Anesthesia Type: 1% lidocaine without epinephrine
Expiration Date (Month Year): 2022-4-30
Additional Area 4 Location: Perioral
Lot #: [id number]

## 2021-10-26 NOTE — PROCEDURE: DYSPORT
Inferior Lateral Orbicularis Oculi Units: 0
Show Nasal Units: Yes
Additional Area 4 Location: 2cm high forehead
Detail Level: Simple
Expiration Date (Month Year): 2021/05
Show Mentalis Units: No
Periorbital Skin Units: 10
Additional Area 1 Location: high forehead
Forehead Units: 5
Additional Area 3 Location: high forehead 3 cm above brows
Consent: Written consent obtained. Risks include but not limited to lid/brow ptosis, bruising, swelling, diplopia, temporary effect, incomplete chemical denervation.
Dilution (U/ 0.1cc): 1.5
Additional Area 5 Location: glabella
Lot #: [id number]
Price (Use Numbers Only, No Special Characters Or $): 0.00
Additional Area 2 Location: bunny line

## 2022-06-13 ENCOUNTER — APPOINTMENT (RX ONLY)
Dept: URBAN - METROPOLITAN AREA CLINIC 23 | Facility: CLINIC | Age: 62
Setting detail: DERMATOLOGY
End: 2022-06-13

## 2022-06-13 DIAGNOSIS — Z41.9 ENCOUNTER FOR PROCEDURE FOR PURPOSES OTHER THAN REMEDYING HEALTH STATE, UNSPECIFIED: ICD-10-CM

## 2022-06-13 PROCEDURE — ? FILLERS

## 2022-06-13 PROCEDURE — ? INVENTORY

## 2022-06-13 PROCEDURE — ? DYSPORT

## 2022-06-13 NOTE — PROCEDURE: FILLERS
Tear [place]roughs Filler  Volume In Cc: 0
Include Cannula Information In Note?: No
Number Of Syringes (Required For Inventory): 1
Additional Anesthesia Volume In Cc: 6
Additional Area 1 Location: lateral jawline, lateral mouth, marionette lines.
Inventory Information: This plan will send filler information to inventory based on the fillers you select. Multiple fillers can be sent but you must ensure you select the appropriate fillers in the inventory plan.
Detail Level: Detailed
Additional Area 1 Location: lateral mouth, perioral fine lines, marionette lines.
Filler: Mohan Amaya
Show Inventory Tab: Show
Consent: Written consent obtained. Risks include but not limited to bruising, beading, irregular texture, ulceration, infection, allergic reaction, scar formation, incomplete augmentation, temporary nature, procedural pain.
Post-Care Instructions: Patient instructed to apply ice to reduce swelling.
Map Statment: See [address] for Complete Details
Anesthesia Type: 1% lidocaine with epinephrine
Anesthesia Volume In Cc: 0.5

## 2022-06-13 NOTE — PROCEDURE: DYSPORT
Lateral Platysmal Bands Units: 0
Show Ucl Units: No
Show Forehead Units: Yes
Dilution (U/ 0.1cc): 1.5
Consent: Written consent obtained. Risks include but not limited to lid/brow ptosis, bruising, swelling, diplopia, temporary effect, incomplete chemical denervation.
Additional Area 3 Location: glabella
Forehead Units: [address]
Additional Area 1 Location: lat brows
Detail Level: Simple
Additional Area 4 Location: 2cm high forehead
Additional Area 2 Location: crows feet
Glabellar Complex Units: [address]
Additional Area 1 Units: 10
Expiration Date (Month Year): 2022-08
Show Inventory Tab: Show
Periorbital Skin Units: [address]
Lot #: [id number]
Price (Use Numbers Only, No Special Characters Or $): 0.00

## 2022-09-02 ENCOUNTER — APPOINTMENT (RX ONLY)
Dept: URBAN - METROPOLITAN AREA CLINIC 23 | Facility: CLINIC | Age: 62
Setting detail: DERMATOLOGY
End: 2022-09-02

## 2022-09-02 DIAGNOSIS — Z41.9 ENCOUNTER FOR PROCEDURE FOR PURPOSES OTHER THAN REMEDYING HEALTH STATE, UNSPECIFIED: ICD-10-CM

## 2022-09-02 PROCEDURE — ? FILLERS

## 2022-09-02 NOTE — PROCEDURE: FILLERS
Dorsal Hands Filler  Volume In Cc: 0
Number Of Syringes (Required For Inventory): 1
Include Cannula Information In Note?: No
Additional Anesthesia Volume In Cc: 6
Additional Area 1 Location: lateral cheeks, lateral jawline, lateral mouth, marionette lines.
Inventory Information: This plan will send filler information to inventory based on the fillers you select. Multiple fillers can be sent but you must ensure you select the appropriate fillers in the inventory tab.
Detail Level: Detailed
Show Inventory Tab: Show
Filler: Restylane Kysse
Consent: Written consent obtained. Risks include but not limited to bruising, beading, irregular texture, ulceration, infection, allergic reaction, scar formation, incomplete augmentation, temporary nature, procedural pain.
Additional Area 1 Location: lateral mouth, perioral fine lines, marionette lines.
Post-Care Instructions: Patient instructed to apply ice to reduce swelling.
Map Statment: See [address] for Complete Details
Anesthesia Type: 1% lidocaine with epinephrine
Anesthesia Volume In Cc: 0.5

## 2023-04-03 ENCOUNTER — APPOINTMENT (RX ONLY)
Dept: URBAN - METROPOLITAN AREA CLINIC 23 | Facility: CLINIC | Age: 63
Setting detail: DERMATOLOGY
End: 2023-04-03

## 2023-04-03 DIAGNOSIS — Z41.9 ENCOUNTER FOR PROCEDURE FOR PURPOSES OTHER THAN REMEDYING HEALTH STATE, UNSPECIFIED: ICD-10-CM

## 2023-04-03 PROCEDURE — ? DYSPORT

## 2023-04-03 PROCEDURE — ? FILLERS

## 2023-04-03 NOTE — PROCEDURE: DYSPORT
Additional Area 3 Units: [address] [facility]
Detail Level: Simple
Show Glabellar Units: Yes
Show Mentalis Units: No
Periorbital Skin Units: 0
Additional Area 3 Location: lateral eyes
Expiration Date (Month Year): 2022-08
Additional Area 2 Units: [address] [facility]
Show Inventory Tab: Show
Additional Area 5 Location: glabella
Additional Area 2 Location: high forehead
Price (Use Numbers Only, No Special Characters Or $): 0.00
Additional Area 1 Units: [address]
Consent: Written consent obtained. Risks include but not limited to lid/brow ptosis, bruising, swelling, diplopia, temporary effect, incomplete chemical denervation.
Lot #: [id number]
Additional Area 4 Location: 2cm high forehead
Dilution (U/ 0.1cc): 1.5

## 2023-04-03 NOTE — PROCEDURE: FILLERS
Cheeks Filler Volume In Cc: 0
Additional Area 1 Volume In Cc: 1
Additional Area 1 Location: lateral mouth, marionette lines, lateral jawline
Include Cannula Information In Note?: No
Inventory Information: This plan will send filler information to inventory based on the fillers you select. Multiple fillers can be sent but you must ensure you select the appropriate fillers in the inventory tab.
Detail Level: Detailed
Additional Anesthesia Volume In Cc: 6
Show Inventory Tab: Show
Anesthesia Volume In Cc: 0.5
Map Statment: See [address] for Complete Details
Post-Care Instructions: Patient instructed to apply ice to reduce swelling.
Consent: Written consent obtained. Risks include but not limited to bruising, beading, irregular texture, ulceration, infection, allergic reaction, scar formation, incomplete augmentation, temporary nature, procedural pain.
Additional Area 1 Location: lateral mouth, perioral fine lines, marionette lines.
Anesthesia Type: 1% lidocaine with epinephrine
Filler: Mohan Amaya

## 2023-07-25 ENCOUNTER — APPOINTMENT (RX ONLY)
Dept: URBAN - METROPOLITAN AREA CLINIC 23 | Facility: CLINIC | Age: 63
Setting detail: DERMATOLOGY
End: 2023-07-25

## 2023-07-25 DIAGNOSIS — Z41.9 ENCOUNTER FOR PROCEDURE FOR PURPOSES OTHER THAN REMEDYING HEALTH STATE, UNSPECIFIED: ICD-10-CM

## 2023-07-25 PROCEDURE — ? BOTOX

## 2023-07-25 NOTE — PROCEDURE: BOTOX
Additional Area 2 Units: 0
Show Nasal Units: Yes
Show Right And Left Brow Units: No
Show Inventory Tab: Show
Price (Use Numbers Only, No Special Characters Or $): 0.00
Consent: Written consent obtained. Risks include but not limited to lid/brow ptosis, bruising, swelling, diplopia, temporary effect, incomplete chemical denervation.
Lot #: [id number]
Additional Area 1 Location: right brow (special)
Additional Area 1 Units: 4
Dilution (U/0.1 Cc): 2.5
Additional Area 6 Location: [facility].
Additional Area 3 Location: high forehead 2 cm above brows
Incrementing Botox Units: By 0.5 Units
Additional Area 2 Location: chin
Detail Level: Zone
Additional Area 5 Location: high forehead 1.5cm above brows
Expiration Date (Month Year): 2024-03

## 2023-09-18 ENCOUNTER — APPOINTMENT (RX ONLY)
Dept: URBAN - METROPOLITAN AREA CLINIC 23 | Facility: CLINIC | Age: 63
Setting detail: DERMATOLOGY
End: 2023-09-18

## 2023-09-18 DIAGNOSIS — L82.0 INFLAMED SEBORRHEIC KERATOSIS: ICD-10-CM

## 2023-09-18 DIAGNOSIS — Z41.9 ENCOUNTER FOR PROCEDURE FOR PURPOSES OTHER THAN REMEDYING HEALTH STATE, UNSPECIFIED: ICD-10-CM

## 2023-09-18 DIAGNOSIS — L81.4 OTHER MELANIN HYPERPIGMENTATION: ICD-10-CM

## 2023-09-18 DIAGNOSIS — L82.1 OTHER SEBORRHEIC KERATOSIS: ICD-10-CM

## 2023-09-18 PROCEDURE — ? BOTOX

## 2023-09-18 PROCEDURE — ? SUNSCREEN RECOMMENDATIONS

## 2023-09-18 PROCEDURE — ? COUNSELING

## 2023-09-18 PROCEDURE — ? BENIGN DESTRUCTION

## 2023-09-18 PROCEDURE — 17110 DESTRUCTION B9 LES UP TO 14: CPT

## 2023-09-18 PROCEDURE — 99213 OFFICE O/P EST LOW 20 MIN: CPT | Mod: 25

## 2023-09-18 ASSESSMENT — LOCATION ZONE DERM: LOCATION ZONE: ARM

## 2023-09-18 ASSESSMENT — LOCATION DETAILED DESCRIPTION DERM: LOCATION DETAILED: RIGHT ANTERIOR SHOULDER

## 2023-09-18 ASSESSMENT — LOCATION SIMPLE DESCRIPTION DERM: LOCATION SIMPLE: RIGHT SHOULDER

## 2023-09-18 NOTE — PROCEDURE: BOTOX
Nasal Root Units: 0
Show Glabellar Units: Yes
Glabellar Complex Units: 4
Incrementing Botox Units: By 0.5 Units
Additional Area 1 Location: right brow (special)
Expiration Date (Month Year): 2024-03
Show Right And Left Pupillary Line Units: No
Detail Level: Zone
Price (Use Numbers Only, No Special Characters Or $): 0.00
Show Inventory Tab: Show
Additional Area 6 Location: [facility].
Additional Area 3 Location: high forehead 2 cm above brows
Lot #: [id number]
Consent: Written consent obtained. Risks include but not limited to lid/brow ptosis, bruising, swelling, diplopia, temporary effect, incomplete chemical denervation.
Dilution (U/0.1 Cc): 2.5
Additional Area 5 Location: high forehead 1.5cm above brows
Additional Area 2 Location: chin

## 2023-09-18 NOTE — HPI: SKIN LESION
Is This A New Presentation, Or A Follow-Up?: Skin Lesion
Additional History: Pt states she get it when she get her Botox wears off.

## 2023-09-18 NOTE — PROCEDURE: BENIGN DESTRUCTION
Treatment Number (Will Not Render If 0): 0
Medical Necessity Information: It is in your best interest to select a reason for this procedure from the list below. All of these items fulfill various CMS LCD requirements except the new and changing color options.
Post-Care Instructions: I reviewed with the patient in detail post-care instructions. Patient is to wear sunprotection, and avoid picking at any of the treated lesions. Pt may apply Vaseline to crusted or scabbing areas.
Anesthesia Volume In Cc: 0.3
Medical Necessity Clause: This procedure was medically necessary because the lesions that were treated were:
Add 52 Modifier (Optional): no
Consent: The patient's consent was obtained including but not limited to risks of crusting, scabbing, blistering, scarring, darker or lighter pigmentary change, recurrence, incomplete removal and infection.
Render Post-Care Instructions In Note?: yes
Detail Level: Simple

## 2023-09-18 NOTE — HPI: RASH
Is This A New Presentation, Or A Follow-Up?: Rash
Additional History: Pt used Triamcinolone acetinide only for one day.

## 2023-11-20 ENCOUNTER — APPOINTMENT (RX ONLY)
Dept: URBAN - METROPOLITAN AREA CLINIC 23 | Facility: CLINIC | Age: 63
Setting detail: DERMATOLOGY
End: 2023-11-20

## 2023-11-30 ENCOUNTER — APPOINTMENT (RX ONLY)
Dept: URBAN - METROPOLITAN AREA CLINIC 23 | Facility: CLINIC | Age: 63
Setting detail: DERMATOLOGY
End: 2023-11-30

## 2023-11-30 DIAGNOSIS — Z41.9 ENCOUNTER FOR PROCEDURE FOR PURPOSES OTHER THAN REMEDYING HEALTH STATE, UNSPECIFIED: ICD-10-CM

## 2023-11-30 PROCEDURE — ? BOTOX

## 2023-12-20 ENCOUNTER — RX ONLY (OUTPATIENT)
Age: 63
Setting detail: RX ONLY
End: 2023-12-20

## 2023-12-20 RX ORDER — CLOBETASOL PROPIONATE 0.5 MG/G
PRN AEROSOL, FOAM TOPICAL
Qty: 50 | Refills: 2 | Status: ERX | COMMUNITY
Start: 2023-12-20

## 2024-01-08 ENCOUNTER — APPOINTMENT (RX ONLY)
Dept: URBAN - METROPOLITAN AREA CLINIC 23 | Facility: CLINIC | Age: 64
Setting detail: DERMATOLOGY
End: 2024-01-08

## 2024-01-08 DIAGNOSIS — Z41.9 ENCOUNTER FOR PROCEDURE FOR PURPOSES OTHER THAN REMEDYING HEALTH STATE, UNSPECIFIED: ICD-10-CM

## 2024-01-08 PROCEDURE — ? PULSED-DYE LASER

## 2024-01-08 PROCEDURE — ? BOTOX

## 2024-01-08 ASSESSMENT — LOCATION ZONE DERM: LOCATION ZONE: NOSE

## 2024-01-08 ASSESSMENT — LOCATION DETAILED DESCRIPTION DERM: LOCATION DETAILED: NASAL ROOT

## 2024-01-08 ASSESSMENT — LOCATION SIMPLE DESCRIPTION DERM: LOCATION SIMPLE: NOSE

## 2024-01-08 NOTE — PROCEDURE: BOTOX
Show Nasal Units: Yes
Inferior Lateral Orbicularis Oculi Units: 0
Lot #: [id number]
Show Ucl Units: No
Additional Area 1 Units: 8
Consent: Written consent obtained. Risks include but not limited to lid/brow ptosis, bruising, swelling, diplopia, temporary effect, incomplete chemical denervation.
Dilution (U/0.1 Cc): 2.5
Additional Area 3 Location: high forehead 2 cm above brows
Additional Area 6 Location: [facility].
Detail Level: Zone
Incrementing Botox Units: By 0.5 Units
Show Inventory Tab: Show
Additional Area 2 Location: chin
Additional Area 5 Location: high forehead 1.5cm above brows
Expiration Date (Month Year): 2024-03
Price (Use Numbers Only, No Special Characters Or $): 0.00
Additional Area 1 Location: bunny lines

## 2024-01-08 NOTE — PROCEDURE: PULSED-DYE LASER
Location Override: behind  right Ears
Location Override: cheeks
Pulse Duration: .45 ms
Delay Time (Ms): 20
Delay Time (Ms): 10
Spot Size: 7 mm
Price (Use Numbers Only, No Special Characters Or $): 035
Detail Level: Zone
Post-Care Instructions: I reviewed with the patient in detail post-care instructions. Patient should stay away from the sun and wear sun protection until treated areas are fully healed.
Fluence In J/Cm2 (Optional): 10.50
Fluence In J/Cm2 (Optional): 8:00
Laser Type: Vbeam 595nm
Cryogen Time (Ms): 30
Treated Area: small area
Pulse Duration: 10 ms
Location Override: behind left Ear
Pulse Duration: 20 ms
Pulse Duration: 1.5 ms
Spot Size: 10 mm
Consent: Written consent obtained, risks reviewed including but not limited to crusting, scabbing, blistering, scarring, darker or lighter pigmentary change, incidental hair removal, bruising, and/or incomplete removal.
Post-Procedure Care: Sunscreen applied. Post care reviewed with patient.
Treated Area: medium area

## 2024-02-26 ENCOUNTER — APPOINTMENT (RX ONLY)
Dept: URBAN - METROPOLITAN AREA CLINIC 23 | Facility: CLINIC | Age: 64
Setting detail: DERMATOLOGY
End: 2024-02-26

## 2024-02-26 DIAGNOSIS — Z41.9 ENCOUNTER FOR PROCEDURE FOR PURPOSES OTHER THAN REMEDYING HEALTH STATE, UNSPECIFIED: ICD-10-CM

## 2024-02-26 PROCEDURE — ? FILLERS

## 2024-02-26 PROCEDURE — ? VBEAM PERFECTA LASER

## 2024-02-26 PROCEDURE — ? INVENTORY

## 2024-02-26 NOTE — PROCEDURE: FILLERS
Additional Area 5 Volume In Cc: 0
Include Cannula Information In Note?: No
Additional Anesthesia Volume In Cc: 6
Inventory Information: This plan will send filler information to inventory based on the fillers you select. Multiple fillers can be sent but you must ensure you select the appropriate fillers in the inventory tab.
Number Of Syringes (Required For Inventory): 1
Detail Level: Detailed
Additional Area 1 Location: Vermillion lips, right and left earlobe
Show Inventory Tab: Show
Additional Area 1 Volume In Cc: 0.5
Filler: Restylane
Consent: Written consent obtained. Risks include but not limited to bruising, beading, irregular texture, ulceration, infection, allergic reaction, scar formation, incomplete augmentation, temporary nature, procedural pain.
Post-Care Instructions: Patient instructed to apply ice to reduce swelling.
Additional Area 1 Location: lateral mouth, perioral fine lines, marionette lines.
Map Statment: See Attach Map for Complete Details
Anesthesia Type: 1% lidocaine with epinephrine

## 2024-02-26 NOTE — PROCEDURE: VBEAM PERFECTA LASER
Post-Care Instructions: I reviewed with the patient in detail post-care instructions.  Cool compresses or cold gel packs may be applied after treatment.  Exposure to the sun should be avoided and sun protection and sunscreen should be used.
Spray Time (Ms): 0
Pulse Duration: 1.5 ms
Pre-Procedure: The treatment areas identified by the patient were cleansed and treatment was performed with the parameters mentioned above.
Fluence (J/Cm2): 7.00
Spot Size: 7 mm
Is There A Second Setting?: no
Post-Procedure: Following the procedure the post care instructions were reviewed with the patient.
Location: surgical scars
Detail Level: Zone
Consent: Written consent obtained.  Risks were reviewed including but not limited to crusting, scabbing, blistering, scarring, darker or lighter pigmentary change, bruising, and/or incomplete response.

## 2024-04-16 ENCOUNTER — APPOINTMENT (RX ONLY)
Dept: URBAN - METROPOLITAN AREA CLINIC 23 | Facility: CLINIC | Age: 64
Setting detail: DERMATOLOGY
End: 2024-04-16

## 2024-04-16 DIAGNOSIS — D49.2 NEOPLASM OF UNSPECIFIED BEHAVIOR OF BONE, SOFT TISSUE, AND SKIN: ICD-10-CM

## 2024-04-16 DIAGNOSIS — L82.1 OTHER SEBORRHEIC KERATOSIS: ICD-10-CM

## 2024-04-16 DIAGNOSIS — Z41.9 ENCOUNTER FOR PROCEDURE FOR PURPOSES OTHER THAN REMEDYING HEALTH STATE, UNSPECIFIED: ICD-10-CM

## 2024-04-16 DIAGNOSIS — Z85.828 PERSONAL HISTORY OF OTHER MALIGNANT NEOPLASM OF SKIN: ICD-10-CM

## 2024-04-16 DIAGNOSIS — L81.4 OTHER MELANIN HYPERPIGMENTATION: ICD-10-CM

## 2024-04-16 PROCEDURE — ? SUNSCREEN RECOMMENDATIONS

## 2024-04-16 PROCEDURE — 11102 TANGNTL BX SKIN SINGLE LES: CPT

## 2024-04-16 PROCEDURE — ? BOTOX

## 2024-04-16 PROCEDURE — ? COUNSELING

## 2024-04-16 PROCEDURE — ? BIOPSY BY SHAVE METHOD

## 2024-04-16 PROCEDURE — 99213 OFFICE O/P EST LOW 20 MIN: CPT | Mod: 25

## 2024-04-16 ASSESSMENT — LOCATION DETAILED DESCRIPTION DERM
LOCATION DETAILED: STERNAL NOTCH
LOCATION DETAILED: NASAL DORSUM

## 2024-04-16 ASSESSMENT — LOCATION ZONE DERM
LOCATION ZONE: TRUNK
LOCATION ZONE: NOSE

## 2024-04-16 ASSESSMENT — LOCATION SIMPLE DESCRIPTION DERM
LOCATION SIMPLE: CHEST
LOCATION SIMPLE: NOSE

## 2024-04-16 NOTE — PROCEDURE: BIOPSY BY SHAVE METHOD
Detail Level: Detailed
Depth Of Biopsy: dermis
Was A Bandage Applied: Yes
Size Of Lesion In Cm: 0
Biopsy Type: H and E
Biopsy Method: 15 blade
Anesthesia Type: 1% lidocaine without epinephrine
Anesthesia Volume In Cc: 0.3
Hemostasis: Electrocautery
Wound Care: Vaseline
Dressing: bandage
Destruction After The Procedure: No
Type Of Destruction Used: Curettage
Curettage Text: The wound bed was treated with curettage after the biopsy was performed.
Cryotherapy Text: The wound bed was treated with cryotherapy after the biopsy was performed.
Electrodesiccation Text: The wound bed was treated with electrodesiccation after the biopsy was performed.
Electrodesiccation And Curettage Text: The wound bed was treated with electrodesiccation and curettage after the biopsy was performed.
Silver Nitrate Text: The wound bed was treated with silver nitrate after the biopsy was performed.
Lab: [id number]
Consent: The provider's intent is to obtain a tissue sample solely for diagnostic purposes.  Written consent to obtain tissue sample was obtained and risks were reviewed including but not limited to scarring, infection, bleeding, scabbing, incomplete removal, nerve damage and allergy to anesthesia.
Post-Care Instructions: I reviewed with the patient in detail post-care instructions. Patient is to keep the biopsy site dry overnight, and then apply bacitracin twice daily until healed. Patient may apply hydrogen peroxide soaks to remove any crusting.
Notification Instructions: Patient will be notified of biopsy results. However, patient instructed to call the office if not contacted within 2 weeks.
Billing Type: Third-Party Bill
Information: Selecting Yes will display possible errors in your note based on the variables you have selected. This validation is only offered as a suggestion for you. PLEASE NOTE THAT THE VALIDATION TEXT WILL BE REMOVED WHEN YOU FINALIZE YOUR NOTE. IF YOU WANT TO FAX A PRELIMINARY NOTE YOU WILL NEED TO TOGGLE THIS TO 'NO' IF YOU DO NOT WANT IT IN YOUR FAXED NOTE.

## 2024-04-16 NOTE — PROCEDURE: BOTOX
Additional Area 6 Units: 0
Show Additional Area 6: Yes
Detail Level: Zone
Forehead Units: 24
Price (Use Numbers Only, No Special Characters Or $): 0.00
Additional Area 5 Location: high forehead 1.5cm above brows
Show Right And Left Brow Units: No
Additional Area 2 Location: chin
Expiration Date (Month Year): 2024-03
Additional Area 1 Location: neckbands
Dilution (U/0.1 Cc): 2.5
Show Inventory Tab: Show
Incrementing Botox Units: By 0.5 Units
Additional Area 6 Location: [facility].
Consent: Written consent obtained. Risks include but not limited to lid/brow ptosis, bruising, swelling, diplopia, temporary effect, incomplete chemical denervation.
Additional Area 3 Location: high forehead 2 cm above brows
Lot #: [id number]

## 2024-09-30 ENCOUNTER — APPOINTMENT (RX ONLY)
Dept: URBAN - METROPOLITAN AREA CLINIC 23 | Facility: CLINIC | Age: 64
Setting detail: DERMATOLOGY
End: 2024-09-30

## 2024-09-30 DIAGNOSIS — Z41.9 ENCOUNTER FOR PROCEDURE FOR PURPOSES OTHER THAN REMEDYING HEALTH STATE, UNSPECIFIED: ICD-10-CM

## 2024-09-30 PROCEDURE — ? BOTOX

## 2024-09-30 PROCEDURE — ? FILLERS

## 2024-09-30 PROCEDURE — ? PICOWAY LASER

## 2024-09-30 PROCEDURE — ? INVENTORY

## 2024-09-30 NOTE — PROCEDURE: BOTOX
Anterior Platysmal Bands Units: 0
Show Glabellar Units: Yes
Detail Level: Zone
Incrementing Botox Units: By 0.5 Units
Show Lcl Units: No
Glabellar Complex Units: 24
Show Inventory Tab: Show
Additional Area 5 Location: high forehead 1.5cm above brows
Expiration Date (Month Year): 2024-03
Forehead Units: 12
Additional Area 2 Location: chin
Price (Use Numbers Only, No Special Characters Or $): 0.00
Consent: Written consent obtained. Risks include but not limited to lid/brow ptosis, bruising, swelling, diplopia, temporary effect, incomplete chemical denervation.
Additional Area 1 Location: neck bands
Lot #: [id number]
Additional Area 3 Location: high forehead 2 cm above brows
Dilution (U/0.1 Cc): 2.5
Additional [facility]

## 2024-09-30 NOTE — PROCEDURE: FILLERS
[place] Filler Volume In Cc: 0
Consent: Written consent obtained. Risks include but not limited to bruising, beading, irregular texture, ulceration, infection, allergic reaction, scar formation, incomplete augmentation, temporary nature, procedural pain.
Anesthesia Type: 1% lidocaine with epinephrine
Post-Care Instructions: Patient instructed to apply ice to reduce swelling.
Number Of Syringes (Required For Inventory): 1
Include Cannula Information In Note?: No
Anesthesia Volume In Cc: 0.5
Inventory Information: This plan will send filler information to inventory based on the fillers you select. Multiple fillers can be sent but you must ensure you select the appropriate fillers in the inventory tab.
Additional Anesthesia Volume In Cc: 6
Detail Level: Detailed
Additional Area 1 Location: ear lobes, bottom lip
Show Inventory Tab: Show
Filler: Restylane
Map Statment: See Attach Map for Complete Details
Additional Area 1 Location: lateral mouth, perioral fine lines, marionette lines.

## 2024-09-30 NOTE — PROCEDURE: P[place]AY LASER
Treatment Number: 0
Wavelength: 532 nm
Spot Size: 2.0 mm
Fluence (J/Cm2): 2
Hide Pulse Duration?: No
Fluence (J/Cm2): 0.8
Detail Level: Detailed
Total Pulses (Settings #2): 144
Consent: Written consent obtained, risks reviewed including but not limited to crusting, scabbing, blistering, scarring, darker or lighter pigmentary change, paradoxical hair regrowth, incomplete removal of hair and infection.
Anesthesia Type: 1% lidocaine with epinephrine
Location Override: lower lip spots
Spot Size: 4.0 mm
Frequency (Hz): 1hz
Pre-Procedure: Prior to proceeding the treatment areas were cleaned and all present put on their eye protection.
Pulse Duration: 2.5 ms
Total Pulses (Settings #3): 2 passes 725 pulse
Post-Procedure Care: Immediate endpoint: perifollicular erythema and edema. Vaseline and ice applied. Post care reviewed with patient.
Wavelength: 1064 nm
Spot Size: 6.5 mm
Price (Use Numbers Only, No Special Characters Or $): 380
Handpiece: Zoom
Post-Care Instructions: I reviewed with the patient in detail post-care instructions. Patient should avoid sun for a minimum of 4 weeks before and after treatment.
Fluence (J/Cm2): 0.6
Treatment Number: 1

## 2025-01-14 ENCOUNTER — APPOINTMENT (OUTPATIENT)
Dept: URBAN - METROPOLITAN AREA CLINIC 23 | Facility: CLINIC | Age: 65
Setting detail: DERMATOLOGY
End: 2025-01-14

## 2025-01-14 DIAGNOSIS — Z41.9 ENCOUNTER FOR PROCEDURE FOR PURPOSES OTHER THAN REMEDYING HEALTH STATE, UNSPECIFIED: ICD-10-CM

## 2025-01-14 PROCEDURE — ? BOTOX

## 2025-01-14 NOTE — PROCEDURE: BOTOX
Left Pupillary Line Units: 0
Show Forehead Units: Yes
Additional Area 1 Units: 8
Additional Area 1 Location: neckbands
Detail Level: Zone
Expiration Date (Month Year): 2024-03
Show Right And Left Pupillary Line Units: No
Forehead Units: 12
Additional Area 6 Location: [facility].
Additional Area 3 Location: high forehead 2 cm above brows
Show Inventory Tab: Show
Price (Use Numbers Only, No Special Characters Or $): 0.00
Lot #: [id number]
Dilution (U/0.1 Cc): 2.5
Additional Area 2 Location: lat brows
Additional Area 5 Location: high forehead 1.5cm above brows
Consent: Written consent obtained. Risks include but not limited to lid/brow ptosis, bruising, swelling, diplopia, temporary effect, incomplete chemical denervation.
Additional Area 2 Units: 4
Incrementing Botox Units: By 0.5 Units

## 2025-04-29 ENCOUNTER — RX ONLY (RX ONLY)
Age: 65
End: 2025-04-29

## 2025-04-29 RX ORDER — SILVER SULFADIAZINE 10 MG/G
CREAM TOPICAL
Qty: 50 | Refills: 1 | Status: ERX | COMMUNITY
Start: 2025-04-29